# Patient Record
Sex: FEMALE | URBAN - METROPOLITAN AREA
[De-identification: names, ages, dates, MRNs, and addresses within clinical notes are randomized per-mention and may not be internally consistent; named-entity substitution may affect disease eponyms.]

---

## 2021-07-02 ENCOUNTER — OFFICE VISIT (OUTPATIENT)
Dept: URBAN - METROPOLITAN AREA CLINIC 17 | Facility: CLINIC | Age: 81
End: 2021-07-02
Payer: MEDICARE

## 2021-07-02 DIAGNOSIS — Z79.899 OTHER LONG TERM (CURRENT) DRUG THERAPY: ICD-10-CM

## 2021-07-02 DIAGNOSIS — H25.12 AGE-RELATED NUCLEAR CATARACT, LEFT EYE: ICD-10-CM

## 2021-07-02 DIAGNOSIS — M06.9 RHEUMATOID ARTHRITIS, UNSPECIFIED: ICD-10-CM

## 2021-07-02 DIAGNOSIS — H25.13 AGE-RELATED NUCLEAR CATARACT, BILATERAL: Primary | ICD-10-CM

## 2021-07-02 PROCEDURE — 99204 OFFICE O/P NEW MOD 45 MIN: CPT | Performed by: OPHTHALMOLOGY

## 2021-07-02 ASSESSMENT — INTRAOCULAR PRESSURE
OD: 11
OS: 12

## 2021-07-02 ASSESSMENT — VISUAL ACUITY
OD: 20/50
OS: 20/40

## 2021-07-02 ASSESSMENT — KERATOMETRY
OS: 42.88
OD: 43.00

## 2021-07-02 NOTE — IMPRESSION/PLAN
Impression: Other long term (current) drug therapy: Z79.899. Plan: Discussed diagnosis in detail with patient. Advised patient of condition. Will continue to observe condition and or symptoms. No Plaquenil toxicity seen today. Pt okay to continue with medication as directed by PCP.

## 2021-07-02 NOTE — IMPRESSION/PLAN
Impression: Age-related nuclear cataract, bilateral: H25.13. Condition: established, worsening. Symptoms: could improve with surgery. Plan: Cataract accounts for patient's complaints. Reviewed risks, benefits, and procedure. Patient desires surgery, schedule ce/iol OD then OS, RL2, discussed lens options, distance refractive target, patient is clear for surgery in [facility] 27.

## 2021-07-09 ENCOUNTER — PRE-OPERATIVE VISIT (OUTPATIENT)
Dept: URBAN - METROPOLITAN AREA CLINIC 17 | Facility: CLINIC | Age: 81
End: 2021-07-09
Payer: MEDICARE

## 2021-07-09 ASSESSMENT — PACHYMETRY
OS: 23.72
OS: 2.51
OD: 23.57
OD: 2.47

## 2021-07-15 ENCOUNTER — SURGERY (OUTPATIENT)
Dept: URBAN - METROPOLITAN AREA SURGERY 7 | Facility: SURGERY | Age: 81
End: 2021-07-15
Payer: MEDICARE

## 2021-07-15 PROCEDURE — 66984 XCAPSL CTRC RMVL W/O ECP: CPT | Performed by: OPHTHALMOLOGY

## 2021-07-16 ENCOUNTER — POST-OPERATIVE VISIT (OUTPATIENT)
Dept: URBAN - METROPOLITAN AREA CLINIC 17 | Facility: CLINIC | Age: 81
End: 2021-07-16
Payer: MEDICARE

## 2021-07-16 DIAGNOSIS — Z48.810 ENCOUNTER FOR SURGICAL AFTERCARE FOLLOWING SURGERY ON A SENSE ORGAN: Primary | ICD-10-CM

## 2021-07-16 PROCEDURE — 99024 POSTOP FOLLOW-UP VISIT: CPT | Performed by: OPTOMETRIST

## 2021-07-16 ASSESSMENT — INTRAOCULAR PRESSURE
OS: 12
OD: 14

## 2021-07-16 NOTE — IMPRESSION/PLAN
Impression: S/P Cataract Extraction by phacoemulsification with IOL placement [id number] OD - 1 Day. Encounter for surgical aftercare following surgery on a sense organ  Z48.810. Plan: Return in 1 week --Advised patient to use artificial tears for comfort.

## 2021-07-22 ENCOUNTER — POST-OPERATIVE VISIT (OUTPATIENT)
Dept: URBAN - METROPOLITAN AREA CLINIC 17 | Facility: CLINIC | Age: 81
End: 2021-07-22
Payer: MEDICARE

## 2021-07-22 PROCEDURE — 99024 POSTOP FOLLOW-UP VISIT: CPT | Performed by: OPHTHALMOLOGY

## 2021-07-22 ASSESSMENT — INTRAOCULAR PRESSURE
OD: 13
OS: 13

## 2021-07-22 ASSESSMENT — VISUAL ACUITY: OD: 20/25

## 2021-07-22 NOTE — IMPRESSION/PLAN
Impression: S/P Cataract Extraction by phacoemulsification with IOL placement [id number] OD - 7 Days. Encounter for surgical aftercare following surgery on a sense organ  Z48.810. Plan: Healing well, okay to proceed with second eye surgery. --Advised patient to use artificial tears for comfort.

## 2021-08-05 ENCOUNTER — SURGERY (OUTPATIENT)
Dept: URBAN - METROPOLITAN AREA SURGERY 7 | Facility: SURGERY | Age: 81
End: 2021-08-05
Payer: MEDICARE

## 2021-08-05 PROCEDURE — 66984 XCAPSL CTRC RMVL W/O ECP: CPT | Performed by: OPHTHALMOLOGY

## 2021-08-06 ENCOUNTER — POST-OPERATIVE VISIT (OUTPATIENT)
Dept: URBAN - METROPOLITAN AREA CLINIC 17 | Facility: CLINIC | Age: 81
End: 2021-08-06
Payer: MEDICARE

## 2021-08-06 PROCEDURE — 99024 POSTOP FOLLOW-UP VISIT: CPT | Performed by: OPTOMETRIST

## 2021-08-06 ASSESSMENT — INTRAOCULAR PRESSURE
OS: 15
OD: 14

## 2021-08-06 NOTE — IMPRESSION/PLAN
Impression: S/P Cataract Extraction by phacoemulsification with IOL placement [id number] OS - 1 Day. Presence of intraocular lens  Z96.1. Post operative instructions reviewed - Plan: Return in 1 week.

## 2021-08-12 ENCOUNTER — POST-OPERATIVE VISIT (OUTPATIENT)
Dept: URBAN - METROPOLITAN AREA CLINIC 17 | Facility: CLINIC | Age: 81
End: 2021-08-12
Payer: MEDICARE

## 2021-08-12 DIAGNOSIS — Z96.1 PRESENCE OF INTRAOCULAR LENS: Primary | ICD-10-CM

## 2021-08-12 PROCEDURE — 99024 POSTOP FOLLOW-UP VISIT: CPT | Performed by: OPTOMETRIST

## 2021-08-12 ASSESSMENT — INTRAOCULAR PRESSURE
OD: 14
OS: 14

## 2021-08-12 ASSESSMENT — VISUAL ACUITY
OD: 20/40
OS: 20/40

## 2021-08-12 NOTE — IMPRESSION/PLAN
Impression: S/P Cataract Extraction by phacoemulsification with IOL placement [id number] OS - 7 Days. Presence of intraocular lens  Z96.1. Plan: --Advised patient to use artificial tears for comfort.

## 2021-09-01 ENCOUNTER — POST-OPERATIVE VISIT (OUTPATIENT)
Dept: URBAN - METROPOLITAN AREA CLINIC 17 | Facility: CLINIC | Age: 81
End: 2021-09-01

## 2021-09-01 PROCEDURE — 99024 POSTOP FOLLOW-UP VISIT: CPT | Performed by: OPTOMETRIST

## 2021-09-01 ASSESSMENT — INTRAOCULAR PRESSURE
OS: 10
OD: 9

## 2021-09-01 ASSESSMENT — VISUAL ACUITY
OD: 20/30
OS: 20/30

## 2021-09-01 NOTE — IMPRESSION/PLAN
Impression: S/P Cataract Extraction by phacoemulsification with IOL placement [id number] OS - 27 Days. Presence of intraocular lens  Z96.1. Plan: Return to Prior OD --Advised patient to use artificial tears for comfort.

## 2024-02-05 ENCOUNTER — OFFICE VISIT (OUTPATIENT)
Dept: URBAN - METROPOLITAN AREA CLINIC 17 | Facility: CLINIC | Age: 84
End: 2024-02-05
Payer: MEDICARE

## 2024-02-05 DIAGNOSIS — H43.811 VITREOUS DEGENERATION, RIGHT EYE: ICD-10-CM

## 2024-02-05 DIAGNOSIS — H04.123 DRY EYE SYNDROME OF BILATERAL LACRIMAL GLANDS: ICD-10-CM

## 2024-02-05 DIAGNOSIS — H26.493 OTHER SECONDARY CATARACT, BILATERAL: Primary | ICD-10-CM

## 2024-02-05 DIAGNOSIS — Z96.1 PRESENCE OF INTRAOCULAR LENS: ICD-10-CM

## 2024-02-05 PROCEDURE — 99214 OFFICE O/P EST MOD 30 MIN: CPT | Performed by: OPTOMETRIST

## 2024-02-05 ASSESSMENT — INTRAOCULAR PRESSURE
OS: 10
OD: 9